# Patient Record
Sex: MALE | Race: BLACK OR AFRICAN AMERICAN
[De-identification: names, ages, dates, MRNs, and addresses within clinical notes are randomized per-mention and may not be internally consistent; named-entity substitution may affect disease eponyms.]

---

## 2019-02-25 ENCOUNTER — HOSPITAL ENCOUNTER (OUTPATIENT)
Dept: HOSPITAL 62 - WC | Age: 69
End: 2019-02-25
Attending: NURSE PRACTITIONER
Payer: MEDICARE

## 2019-02-25 DIAGNOSIS — E11.621: Primary | ICD-10-CM

## 2019-02-25 DIAGNOSIS — L97.222: ICD-10-CM

## 2019-02-25 LAB
ADD MANUAL DIFF: NO
ALBUMIN SERPL-MCNC: 4.5 G/DL (ref 3.5–5)
ALP SERPL-CCNC: 95 U/L (ref 38–126)
ALT SERPL-CCNC: 18 U/L (ref 21–72)
ANION GAP SERPL CALC-SCNC: 12 MMOL/L (ref 5–19)
AST SERPL-CCNC: 18 U/L (ref 17–59)
BASOPHILS # BLD AUTO: 0 10^3/UL (ref 0–0.2)
BASOPHILS NFR BLD AUTO: 0.4 % (ref 0–2)
BILIRUB DIRECT SERPL-MCNC: 0.2 MG/DL (ref 0–0.4)
BILIRUB SERPL-MCNC: 0.4 MG/DL (ref 0.2–1.3)
BUN SERPL-MCNC: 17 MG/DL (ref 7–20)
CALCIUM: 9.6 MG/DL (ref 8.4–10.2)
CHLORIDE SERPL-SCNC: 100 MMOL/L (ref 98–107)
CO2 SERPL-SCNC: 30 MMOL/L (ref 22–30)
CRP SERPL-MCNC: 7.7 MG/L (ref ?–10)
EOSINOPHIL # BLD AUTO: 0.1 10^3/UL (ref 0–0.6)
EOSINOPHIL NFR BLD AUTO: 1.5 % (ref 0–6)
ERYTHROCYTE [DISTWIDTH] IN BLOOD BY AUTOMATED COUNT: 14.6 % (ref 11.5–14)
ERYTHROCYTE [SEDIMENTATION RATE] IN BLOOD: 31 MM/HR (ref 0–20)
GLUCOSE SERPL-MCNC: 177 MG/DL (ref 75–110)
HCT VFR BLD CALC: 42 % (ref 37.9–51)
HGB BLD-MCNC: 14.2 G/DL (ref 13.5–17)
LYMPHOCYTES # BLD AUTO: 0.8 10^3/UL (ref 0.5–4.7)
LYMPHOCYTES NFR BLD AUTO: 13.8 % (ref 13–45)
MCH RBC QN AUTO: 30 PG (ref 27–33.4)
MCHC RBC AUTO-ENTMCNC: 33.9 G/DL (ref 32–36)
MCV RBC AUTO: 89 FL (ref 80–97)
MONOCYTES # BLD AUTO: 0.7 10^3/UL (ref 0.1–1.4)
MONOCYTES NFR BLD AUTO: 11.8 % (ref 3–13)
NEUTROPHILS # BLD AUTO: 4.2 10^3/UL (ref 1.7–8.2)
NEUTS SEG NFR BLD AUTO: 72.5 % (ref 42–78)
PLATELET # BLD: 208 10^3/UL (ref 150–450)
POTASSIUM SERPL-SCNC: 3.7 MMOL/L (ref 3.6–5)
PROT SERPL-MCNC: 7.5 G/DL (ref 6.3–8.2)
RBC # BLD AUTO: 4.75 10^6/UL (ref 4.35–5.55)
SODIUM SERPL-SCNC: 142 MMOL/L (ref 137–145)
TOTAL CELLS COUNTED % (AUTO): 100 %
WBC # BLD AUTO: 5.8 10^3/UL (ref 4–10.5)

## 2019-02-25 PROCEDURE — 80053 COMPREHEN METABOLIC PANEL: CPT

## 2019-02-25 PROCEDURE — 85652 RBC SED RATE AUTOMATED: CPT

## 2019-02-25 PROCEDURE — 83036 HEMOGLOBIN GLYCOSYLATED A1C: CPT

## 2019-02-25 PROCEDURE — 36415 COLL VENOUS BLD VENIPUNCTURE: CPT

## 2019-02-25 PROCEDURE — 86140 C-REACTIVE PROTEIN: CPT

## 2019-02-25 PROCEDURE — 85025 COMPLETE CBC W/AUTO DIFF WBC: CPT

## 2019-11-27 ENCOUNTER — HOSPITAL ENCOUNTER (OUTPATIENT)
Dept: HOSPITAL 62 - WC | Age: 69
End: 2019-11-27
Attending: NURSE PRACTITIONER
Payer: MEDICARE

## 2019-11-27 DIAGNOSIS — E11.621: ICD-10-CM

## 2019-11-27 DIAGNOSIS — L97.212: Primary | ICD-10-CM

## 2019-11-27 LAB
ADD MANUAL DIFF: NO
ALBUMIN SERPL-MCNC: 4.2 G/DL (ref 3.5–5)
ALP SERPL-CCNC: 108 U/L (ref 38–126)
ANION GAP SERPL CALC-SCNC: 11 MMOL/L (ref 5–19)
AST SERPL-CCNC: 20 U/L (ref 17–59)
BASOPHILS # BLD AUTO: 0 10^3/UL (ref 0–0.2)
BASOPHILS NFR BLD AUTO: 0.5 % (ref 0–2)
BILIRUB DIRECT SERPL-MCNC: 0.1 MG/DL (ref 0–0.4)
BILIRUB SERPL-MCNC: 0.4 MG/DL (ref 0.2–1.3)
BUN SERPL-MCNC: 18 MG/DL (ref 7–20)
CALCIUM: 9.2 MG/DL (ref 8.4–10.2)
CHLORIDE SERPL-SCNC: 100 MMOL/L (ref 98–107)
CO2 SERPL-SCNC: 29 MMOL/L (ref 22–30)
CRP SERPL-MCNC: < 5 MG/L (ref ?–10)
EOSINOPHIL # BLD AUTO: 0.1 10^3/UL (ref 0–0.6)
EOSINOPHIL NFR BLD AUTO: 2 % (ref 0–6)
ERYTHROCYTE [DISTWIDTH] IN BLOOD BY AUTOMATED COUNT: 14.4 % (ref 11.5–14)
ERYTHROCYTE [SEDIMENTATION RATE] IN BLOOD: 20 MM/HR (ref 0–20)
GLUCOSE SERPL-MCNC: 241 MG/DL (ref 75–110)
HCT VFR BLD CALC: 38.8 % (ref 37.9–51)
HGB BLD-MCNC: 13.3 G/DL (ref 13.5–17)
LYMPHOCYTES # BLD AUTO: 0.9 10^3/UL (ref 0.5–4.7)
LYMPHOCYTES NFR BLD AUTO: 19.8 % (ref 13–45)
MCH RBC QN AUTO: 30.3 PG (ref 27–33.4)
MCHC RBC AUTO-ENTMCNC: 34.3 G/DL (ref 32–36)
MCV RBC AUTO: 89 FL (ref 80–97)
MONOCYTES # BLD AUTO: 0.6 10^3/UL (ref 0.1–1.4)
MONOCYTES NFR BLD AUTO: 13 % (ref 3–13)
NEUTROPHILS # BLD AUTO: 3.1 10^3/UL (ref 1.7–8.2)
NEUTS SEG NFR BLD AUTO: 64.7 % (ref 42–78)
PLATELET # BLD: 181 10^3/UL (ref 150–450)
POTASSIUM SERPL-SCNC: 3.9 MMOL/L (ref 3.6–5)
PROT SERPL-MCNC: 7.1 G/DL (ref 6.3–8.2)
RBC # BLD AUTO: 4.38 10^6/UL (ref 4.35–5.55)
TOTAL CELLS COUNTED % (AUTO): 100 %
WBC # BLD AUTO: 4.7 10^3/UL (ref 4–10.5)

## 2019-11-27 PROCEDURE — 85652 RBC SED RATE AUTOMATED: CPT

## 2019-11-27 PROCEDURE — 80053 COMPREHEN METABOLIC PANEL: CPT

## 2019-11-27 PROCEDURE — 86140 C-REACTIVE PROTEIN: CPT

## 2019-11-27 PROCEDURE — 83036 HEMOGLOBIN GLYCOSYLATED A1C: CPT

## 2019-11-27 PROCEDURE — 85025 COMPLETE CBC W/AUTO DIFF WBC: CPT

## 2019-11-27 PROCEDURE — 36415 COLL VENOUS BLD VENIPUNCTURE: CPT

## 2019-12-24 ENCOUNTER — HOSPITAL ENCOUNTER (OUTPATIENT)
Dept: HOSPITAL 62 - SP | Age: 69
End: 2019-12-24
Attending: NURSE PRACTITIONER
Payer: MEDICARE

## 2019-12-24 DIAGNOSIS — L97.212: Primary | ICD-10-CM

## 2019-12-24 PROCEDURE — 93922 UPR/L XTREMITY ART 2 LEVELS: CPT

## 2019-12-24 PROCEDURE — 93925 LOWER EXTREMITY STUDY: CPT

## 2019-12-24 NOTE — RADIOLOGY REPORT (SQ)
EXAM DESCRIPTION:  ARTERIAL LOWER EXTREM BILAT



COMPLETED DATE/TIME:  12/24/2019 9:54 am



REASON FOR STUDY:  ULCER RIGHT CALF L97.212  NON-PRESSURE CHRONIC ULCER OF RIGHT CALF W FAT LAYER



COMPARISON:  None.



TECHNIQUE:  Dynamic and static gray scale and color images acquired of the lower extremity arteries. 
Additional selected spectral images recorded.   ABIs recorded.



LIMITATIONS:  None.



FINDINGS:  RIGHT LEG:

ABIS: Calcified vessels.  MAGGIE could not be obtained.

INFLOW ARTERIES: Normal, no obstruction evident.

FEMORAL ARTERIES:Multiphasic waveforms. Normal, no velocity elevation to suggest focal stenosis. Norm
al color Doppler evaluation.  No aneurysm.

POPLITEAL ARTERY:Multiphasic waveforms. Normal, no velocity elevation to suggest focal stenosis. Norm
al color Doppler evaluation.  No aneurysm.

PATENT TIBIOPERONEAL TRUNK AND 3 VESSEL RUNOFF: Yes, normal vessels.

TBI: Not performed.

OTHER: No other significant finding.

LEFT LEG:

ABIS: 0.8

INFLOW ARTERIES: Normal, no obstruction evident.

FEMORAL ARTERIES:Multiphasic waveforms. Normal, no velocity elevation to suggest focal stenosis. Norm
al color Doppler evaluation.  No aneurysm.

POPLITEAL ARTERY:Multiphasic waveforms. Normal, no velocity elevation to suggest focal stenosis. Norm
al color Doppler evaluation.  No aneurysm.

PATENT TIBIOPERONEAL TRUNK AND 3 VESSEL RUNOFF: Yes, normal vessels.

TBI: Not performed.

OTHER: No other significant finding.



IMPRESSION:  1. Calcified infrapopliteal vessels on the right.  Waveforms are multiphasic throughout.


2.  ABIs on the left are calculated 0.8.  No focal areas of stenosis.  No occlusions.



COMMENT:  [facility]

NORMAL: Greater than 1.0

MINIMAL DISEASE: 0.9 to 1.0

CLAUDICATION: 0.5 to 0.9

SEVERE ARTERIAL DISEASE:  Less than 0.5

[facility] AND [facility]

NORMAL: Greater than 1.0 (1.2 If Heavy Calcifications)

NORMAL TO MILD ISCHEMIA: 0.8 to 1.0

MODERATE ISCHEMIA: 0.4 to 0.8

SEVERE ISCHEMIA:  Less than 0.4



TECHNICAL DOCUMENTATION:  JOB ID:  [id number]

 2011 [employer]- All Rights Reserved



Reading location - IP/workstation name: [email or web address]